# Patient Record
Sex: MALE | Race: WHITE | Employment: FULL TIME | URBAN - METROPOLITAN AREA
[De-identification: names, ages, dates, MRNs, and addresses within clinical notes are randomized per-mention and may not be internally consistent; named-entity substitution may affect disease eponyms.]

---

## 2018-05-22 PROCEDURE — 87205 SMEAR GRAM STAIN: CPT | Performed by: OTOLARYNGOLOGY

## 2018-05-22 PROCEDURE — 87070 CULTURE OTHR SPECIMN AEROBIC: CPT | Performed by: OTOLARYNGOLOGY

## 2018-05-25 PROBLEM — L02.11 ABSCESS OF SKIN OF NECK: Status: ACTIVE | Noted: 2018-05-25

## 2024-06-03 ENCOUNTER — GENETICS ENCOUNTER (OUTPATIENT)
Dept: GENETICS | Facility: HOSPITAL | Age: 49
End: 2024-06-03
Attending: GENETIC COUNSELOR, MS
Payer: COMMERCIAL

## 2024-06-03 ENCOUNTER — NURSE ONLY (OUTPATIENT)
Dept: HEMATOLOGY/ONCOLOGY | Facility: HOSPITAL | Age: 49
End: 2024-06-03
Attending: GENETIC COUNSELOR, MS
Payer: COMMERCIAL

## 2024-06-03 DIAGNOSIS — Z80.3 FAMILY HISTORY OF BREAST CANCER: ICD-10-CM

## 2024-06-03 DIAGNOSIS — Z84.81 FAMILY HISTORY OF BRCA2 GENE POSITIVE: Primary | ICD-10-CM

## 2024-06-03 DIAGNOSIS — Z80.9 FAMILY HISTORY OF CANCER: ICD-10-CM

## 2024-06-03 PROCEDURE — 96040 HC GENETIC COUNSELING EA 30 MIN: CPT | Performed by: GENETIC COUNSELOR, MS

## 2024-06-03 PROCEDURE — 36415 COLL VENOUS BLD VENIPUNCTURE: CPT

## 2024-06-03 NOTE — PROGRESS NOTES
Patient Name: Ananda Whiteside  YOB: 1975  Date of Visit: 6/3/2024    Reason for visit: Mr. Whiteside was seen for the purposes of genetic counseling due to a family history of cancer with a BRCA2 [id number] (p.[id number]) pathogenic variant previously found in his mother and sister.     Referring Provider: Dave Alcaraz DO    Medical History: Mr. Whiteside is a pleasant 49 year old male presenting with no personal history of cancer. He had a slightly elevated PSA on  of 5 ng/mL (0.01-0.4 ng/mL), he has been referred to urology.    He has had a colonoscopy last ~1 year ago which was normal per pt with a 10 year recall. He reports having seen dermatology in the past.       Relevant Family History: Mr. Whiteside has no biological children.     He has 2 sisters, 53y with a dx of inflammatory breast cancer dx at 53y who is currently undergoing chemo, she had genetic testing and was found to have the BRCA2 mutation, and 51y with no cancer hx and negative genetic testing.     Mr. Whiteside's mother (80y) was dx with metastatic bladder/urethral cancer at 77y, she was found to have a suspected germline BRCA2 gene mutation on [facility] somatic xT testing, the BRCA2 c.7254_7255del (p.[id number]) pathogenic variant was confirmed to be germline via dedicated germline panel, [facility] 43 gene panel, x2 VUSs in BRCA1 and CTR9 were seen.     There is 1 maternal uncle who  at 60y possibly dt lung cancer, he was a smoker, and 1 maternal aunt who  at 80y. The maternal grandmother  in her 70s dt natural causes, she had a h/o cancer, possibly bladder cancer, dx in her late-60s. The maternal grandfather  in is 50s dt an unknown cause.    Mr. Whiteside's father  after surgery for a cerebral cavernous angioma, he has no h/o cancer, CVA, or bleeding. He was an only child. The paternal grandfather  in his 60s dt a brain tumor dx in his 60s. The paternal grandmother  at 103y dt natural causes.      The rest of the family history is negative for other significant genetic conditions, cancers, or birth defects of any kind. See scanned pedigree for full family history reported during the session.    Mr. Whiteside's maternal and paternal ethnicity is [place]. He is unaware of any Ashkenazi Taoism heritage.    Summary: Because Mr. Whiteside's mother and sister were found to have a pathogenic BRCA2 variant, Mr. Whiteside has an estimated 50% chance to have inherited the same BRCA2 variant as his relatives, genetic testing is indicated. Mr. Whiteside's mother was also found to have 2 variants of uncertain significance, a variant of uncertain significance (VUS) means that a change has been identified in a cancer susceptibility gene; however, it is uncertain if the variant is pathogenic or a non-deleterious change. With time, the variant may be reclassified as either positive or negative. No changes in management of family members is recommended based on a VUS result.     I reviewed the following information with Mr. Whiteside about BRCA2 gene mutations. He was given an informational handout to take with him as well.   Information and Medical Management Recommendations for Individuals with a BRCA2 Pathogenic Variant  Individuals with a single pathogenic BRCA2 mutation have an increased risk to develop certain cancers in their lifetime. BRCA2-related cancer risks include a >60% risk to develop female breast cancer and a significantly increased risk for a second breast primary. There may be up to a >20% 15-year cumulative risk of developing a second contralateral breast cancer in pre-menopausal women and a 25% 20-year cumulative risk of developing a second contralateral breast cancer in women in general. Risks of developing a contralateral breast cancer may vary depending on age at diagnosis of first breast cancer, ER status and/or family history. Other cancer risks associated with BRCA2 pathogenic variants include  an estimated 13-29% lifetime risk of ovarian cancer (females only), a 19-61% lifetime risk for prostate cancer (males only), up to a 5-10% lifetime risk for pancreatic cancer (males and females), an 1.8-7.1% risk for male breast cancer by age 70y, and an elevated risk for melanoma (males and females). It is not possible to predict when or whether a person with a BRCA2 pathogenic variant will develop cancer or what type of cancer they may develop.     Currently recommended medical management for female BRCA1/2 carriers according to the NCCN (V 3.2024) is as follows:   Breast cancer risk  Breast awareness starting at age 18 years.  Clinical breast exam, every 6-12 months, starting at age 25 years.  Breast screening  Age 25-29 years, annual breast MRI screening with and without contrast (or mammogram, only if MRI is unavailable), individualized based on family history if a breast cancer diagnosis before age 30 is present.  Age 30-75 years, annual mammogram and breast MRI screening with and without contrast.  Age >75 years, management should be considered on an individual basis.  For individuals with a BRCA1/2 pathogenic variant who are treated for breast cancer and have not had a bilateral mastectomy, screening with annual mammogram and breast MRI should continue as described above.  Discuss option of risk-reducing mastectomy (RRM, surgical removal of the breasts) and  regarding degree of protection, reconstruction options, risks, and psychosocial and quality of life aspects.    Consider risk reduction agents as options for breast cancer, including discussion of risks and benefits.    Ovarian/fallopian tube/peritoneal/uterine cancer risk  Surgical risk reduction  Recommend risk-reducing salpingo-oophorectomy (RRSO, surgical removal of the fallopian tubes and ovaries), typically between 40-45 years.  Because ovarian cancer onset in individuals with BRCA2 pathogenic variants is an average of 8-10 years later than  in patients with BRCA1 pathogenic variants, it is reasonable to delay RRSO for management of ovarian cancer risk until age 40-45 years in patients with BRCA2 pathogenic variants unless age at diagnosis in the family warrants earlier age for consideration of prophylactic surgery.   Consideration of concurrent hysterectomy (surgical removal of the uterus) given preliminary evidence of elevated serous uterine cancer risk in patients with BRCA1/2 pathogenic/likely pathogenic variants.  Salpingectomy (surgical removal of the fallopian tubes) reduces the risk of ovarian cancer in the general population and is an option for premenopausal patients with hereditary cancer risk who are not yet ready for oophorectomy (surgical removal of the ovaries), completion oophorectomy is still recommended as per gene-specific guidelines. Clinical trials of interval salpingectomy and delayed oophorectomy are ongoing. Strong consideration of surgical choice study  participation if available  CA-125 and pelvic ultrasound are recommended for preoperative planning.  Address bone health, cardiovascular health, psychosocial health, neurologic health, sexual health, and generalized quality-of-life aspects of undergoing RRSO. Consider preoperative menopause management consultation if patient is still premenopausal at time of RRSO.  Hormone replacement therapy is generally not contraindicated and thus should be discussed with premenopausal patients who do not have a personal history of breast cancer.  Non-surgical risk reduction  Consultation with gynecologic oncologist or gynecologist with expertise/experience in genetic susceptibility to gynecologic cancer recommended.  Consideration of combination estrogen/progestin (E/P) contraception (such as oral contraceptive pills [OCP]) for ovulation suppression. Overall, studies in pathogenic/likely pathogenic variant carriers support significant risk reduction benefits for ovarian  cancer.  Levonorgestrel intrauterine device (LNG-IUD) has been shown to reduce risk for ovarian cancer in the average risk population.  Other considerations in premenopausal women  If desired, refer to fertility specialists for discussion of age-related fertility considerations, options for in vitro fertilization, eggand embryo-cryopreservation, and consideration of preimplantation genetic testing, gestational carrier and adoption.     Currently recommended medical management for male BRCA1/2 carries according to the NCCN (V 3.2024) is as follows:  Breast self-exam training and education starting at 35 y.  Clinical breast exam, every 12 months, starting at age 35 y.  Consider annual mammogram screening, especially for male BRCA2 mutation carriers in whom the lifetime risk of breast cancer is up to 7%, starting at age 50y or 10 years before the earliest known male breast cancer in the family (whichever comes first).  Recommend prostate cancer screening for BRCA2 carriers starting at age 40 y.    Melanoma screening (applies to both males and females):  No specific screening guidelines exist for melanoma, but general melanoma risk management is recommended, such as annual full-body skin examination and minimizing UV/sun exposure.     Pancreatic cancers screening (applies to both males and females):  NCCN recommends pancreatic cancer screening in BRCA1/2 carriers be limited to those with a first- or second-degree relative affected with pancreatic cancer. Ideally,  pancreatic cancer screening should be performed in experienced high-volume centers taking place after an in-depth discussion about the potential limitations to screening, including cost, the high incidence of benign or indeterminate pancreatic abnormalities, and uncertainties about the potential benefits of pancreatic cancer screening. If recommended or desired, pancreatic cancer screening typically includes annual endoscopic ultrasound and/or annual contrast  enhanced MRI/MRCP of the pancreas starting at age 50 or 10 years younger than the earliest age of pancreatic cancer diagnosis in the family, with consideration of shorter screening intervals based on clinical judgement and screening findings.    The full version of the NCCN Guidelines is available at the following website:  www.NCCN.org.  All medical management decisions should be discussed with a physician.      Pathogenic variants in the BRCA2 gene are inherited in an autosomal dominant fashion. This means that children, brothers, sisters, and parents of individuals with an BRCA2 variant have a 1 in 2 (50%) chance of having the variant as well. Individuals with an BRCA2 pathogenic variant may develop cancer, or they may not develop and cancers at all. Both males and females can inherit a familial BRCA2 variant and can pass that it on to their children.    Additionally, individuals with a single pathogenic BRCA2 variant are also carriers of autosomal recessive Fanconi anemia. Fanconi anemia is characterized by bone marrow failure with variable additional anomalies, which often include short stature, abnormal skin pigmentation, abnormal thumbs, malformations of the skeletal and central nervous systems, and developmental delay. Risk of leukemia and early-onset solid tumors is significantly elevated with this disorder. For there to be a risk of Fanconi anemia in offspring, both the patient and their partner would each have to carry a pathogenic variant in BRCA2; in this case, the risk to have an affected child is 25%.    Possible genetic testing results  If testing is performed, three results are possible: positive, negative, and variant of uncertain significance.  A positive result indicates a mutation has been identified, and there is an increased risk for the cancers associated with the specific gene.  Since mutations in most cancer susceptibility genes are inherited in an autosomal dominant fashion, siblings and  children of individuals with a mutation have a 50% risk of carrying the mutation as well.      A negative test result would indicate that no mutation was identified in a cancer susceptibility gene.  While testing detects gene mutations, it is possible for a mutation to be present and go undetected.  It is also possible for a mutation to be located in a gene other than those being tested.     A variant of uncertain significance means that a change has been identified a cancer susceptibility gene; however, it is uncertain if the variant is pathogenic or a non-deleterious change.  With time, the variant may be reclassified as either positive or negative.    Since mutation identification is necessary, an affected family member is preferred in order to confirm that a mutation is indeed present in a particular family.  If the mutation can be identified in an affected individual, this information can be used to screen other at-risk individuals in the family.  Individuals who are positive for the same mutation would be expected to have a much greater risk for developing hereditary cancer during their lifetime than the general population and surveillance and management would be rigorous.  For those individuals who are found to not carry the mutation, risks for developing cancer during their lifetime would return to those expected for individuals in the general population.  It should be emphasized that absence of a mutation in an at-risk individual would not eliminate their risk for cancer, but simply return them to the risk expected for the general population. If no affected individual is available for testing, a negative result, while reassuring, cannot be completely informative of the familial cancer risk as it is unknown whether no mutation was found because the individual tested is truly negative for the familial mutation or whether the familial mutation was unable to be detected by the genetic testing method used. In  such cases, screening and follow-up should be guided by personal and family history.     It should be noted that no one under age 18 can have testing performed for mutations in high-penetrance cancer predisposition genes for adult-onset conditions. Mr. Saba genetic information is protected under the Genetic Information Nondiscrimination Act of 2008 (RISHABH). RISHABH is a federal law protecting individuals from genetic discrimination in health insurance and most places of employment. RISHABH protections against genetic discrimination do not apply to other forms of insurance such as life, long term care, disability, and  insurance. Individuals without such policies in place may wish to consider doing so before proceeding with genetic testing, since their genetic information could potentially be used to inform decisions concerning eligibility, premiums, and coverage.    Because Mr. Whiteside's mother and sister were found to have a BRCA2 c.[id number] (p.[id number]*2) pathogenic variant, genetic testing for the know familial BRCA2 pathogenic variant is indicated based on the NCCN guidelines (BOP V.3.2024).      Mr. Whiteside appeared to understand the information presented. On the day of the visit Mr. Whiteside elected to proceed with genetic testing for the know familial BRCA2 mutation and other hereditary cancer syndromes. My office will call Mr. Whiteside as soon as results are received; post-test counseling can be scheduled at that time. Thank you for allowing me to participate in the care of your patient; please do not hesitate to contact my office if you have any questions or concerns, [phone].    Plan:   Blood was drawn and sent out to [facility] for the known familial BRCA2 c.7254_7255del (p.[id number]*2) pathogenic variant previously found in his mother and sister with automatic reflex to the common hereditary cancers panel (TAT: 2 weeks, 48 genes total).   The Genetics office will call Mr. Whiteside when  results are available.  Recommendations for Mr. Whiteside and family members will depend the above genetic testing results.      Send to: [facility]  Time spent with patient: 35 minutes      Indira Jacobsen MS, GC

## 2024-06-10 ENCOUNTER — GENETICS ENCOUNTER (OUTPATIENT)
Dept: HEMATOLOGY/ONCOLOGY | Facility: HOSPITAL | Age: 49
End: 2024-06-10

## 2024-06-10 DIAGNOSIS — Z15.01 BRCA2 GENE MUTATION POSITIVE IN MALE: Primary | ICD-10-CM

## 2024-06-10 DIAGNOSIS — Z15.03 BRCA2 GENE MUTATION POSITIVE IN MALE: Primary | ICD-10-CM

## 2024-06-10 DIAGNOSIS — Z15.09 BRCA2 GENE MUTATION POSITIVE IN MALE: Primary | ICD-10-CM

## 2024-06-10 NOTE — PROGRESS NOTES
Patient Name: Ananda Whiteside  YOB: 1975    Referring Provider:  Dave Alcaraz DO      Reason for Referral:  Mr. Whiteside had genetic testing performed on 6/3/2024 because of a family history of cancer with a BRCA2 c.7254_7255del (p.Auw5852Ucbhh*2) pathogenic variant previously found in his mother and sister.      Genetic Testing Result:  Positive. Mr. Whiteside was found to have inherited the familial BRCA2 c.7254_7255del (p.Sbk6987Kqurf*2) pathogenic variant.  No other known pathogenic variants were found in a total of 48 genes on the [employer] common hereditary cancers panel including: APC, MARCEL, AXIN2, BAP1, BARD1, BMPR1A, BRCA1, BRCA2, BRIP1, CDH1, CDK4, CDKN2A, CHEK2, CTNNA1, DICER1, EPCAM, FH, GREM1, HOXB13, KIT, MBD4, MEN1, MLH1, MSH2, MSH3, MSH6, MUTYH, NF1, NTHL1, PALB2, PDGFRA, PMS2, POLD1, POLE, PTEN, RAD51C, RAD51D, SDHA, SDHB, SDHC, SDHD, SMAD4, SMARCA4, STK11, TP53, TSC1, TSC2, VHL. A variant of uncertain significance, BRCA1 c.4836G>T (p.[id number]), was identified.  Please refer to the report from [facility] for additional testing information.  These results were discussed with Mr. Whiteside via telephone on 6/10/2024.    Summary and Plan:   Mr. Whiteside was found to have inherited the known familial BRCA2 c.7254_7255del (p.Ueq7628Tienj*2) pathogenic variant (harmful genetic mutation) previously. 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 This information is important for Mr. Whiteside and relatives to be aware of. He should discuss this result and the below BRCA2-specific cancer screening recommendations with his doctors.     Mr. Whiteside was also found to have a BRCA1 c.4836G>T (p.Dky7260Clu) variant of uncertain significance. A variant of uncertain significance (VUS) means that a genetic variant has been identified in a cancer  susceptibility gene; however, it is currently uncertain if the variant is pathogenic (harmful) or benign (harmless).  With time, the variant may be reclassified as either harmful or harmless, most VUS's end up being reclassified as harmless variants. Should a VUS get reclassified in the future, the genetic testing laboratory will issue an amended report with the updated classification. No changes in management or testing of family members is recommended based on a VUS result.    The limitations of the testing were discussed with Mr. Whiteside including the chance that additional pathogenic variants in a gene other than those included in this panel might be the cause of cancer in Mr. Whiteisde or in relatives.    Information and Medical Management Recommendations for Individuals with a BRCA2 Pathogenic Variant  Individuals with a single pathogenic BRCA2 mutation have an increased risk to develop certain cancers in their lifetime. BRCA2-related cancer risks include a >60% risk to develop female breast cancer and a significantly increased risk for a second breast primary. There may be up to a >20% 15-year cumulative risk of developing a second contralateral breast cancer in pre-menopausal women and a 25% 20-year cumulative risk of developing a second contralateral breast cancer in women in general. Risks of developing a contralateral breast cancer may vary depending on age at diagnosis of first breast cancer, ER status and/or family history. Other cancer risks associated with BRCA2 pathogenic variants include an estimated 13-29% lifetime risk of ovarian cancer (females only), a 19-61% lifetime risk for prostate cancer (males only), up to a 5-10% lifetime risk for pancreatic cancer (males and females), an 1.8-7.1% risk for male breast cancer by age 70y, and an elevated risk for melanoma (males and females). It is not possible to predict when or whether a person with a BRCA2 pathogenic variant will develop cancer or what  type of cancer they may develop.     Currently recommended medical management for male BRCA1/2 carries according to the NCCN (V 3.2024) is as follows:  Breast self-exam training and education starting at 35 y.  Clinical breast exam, every 12 months, starting at age 35 y.  Consider annual mammogram screening, especially for male BRCA2 mutation carriers in whom the lifetime risk of breast cancer is up to 7%, starting at age 50y or 10 years before the earliest known male breast cancer in the family (whichever comes first).  Recommend prostate cancer screening for BRCA2 carriers starting at age 40 y.    Melanoma screening (applies to both males and females):  No specific screening guidelines exist for melanoma, but general melanoma risk management is recommended, such as annual full-body skin examination and minimizing UV/sun exposure.     Pancreatic cancers screening (applies to both males and females):  NCCN recommends pancreatic cancer screening in BRCA1/2 carriers be limited to those with a first- or second-degree relative affected with pancreatic cancer, Mr. Whiteside reports no family history of pancreatic cancer. Ideally,  pancreatic cancer screening should be performed in experienced high-volume centers taking place after an in-depth discussion about the potential limitations to screening, including cost, the high incidence of benign or indeterminate pancreatic abnormalities, and uncertainties about the potential benefits of pancreatic cancer screening. If recommended or desired, pancreatic cancer screening typically includes annual endoscopic ultrasound and/or annual contrast enhanced MRI/MRCP of the pancreas starting at age 50 or 10 years younger than the earliest age of pancreatic cancer diagnosis in the family, with consideration of shorter screening intervals based on clinical judgement and screening findings.    The full version of the NCCN Guidelines is available at the following website:  www.NCCN.org.   All medical management decisions should be discussed with a physician.      Pathogenic variants in the BRCA2 gene are inherited in an autosomal dominant fashion. This means that children, brothers, sisters, and parents of individuals with an BRCA2 variant have a 1 in 2 (50%) chance of having the variant as well. Individuals with an BRCA2 pathogenic variant may develop cancer, or they may not develop and cancers at all. Both males and females can inherit a familial BRCA2 variant and can pass that it on to their children.    Additionally, individuals with a single pathogenic BRCA2 variant are also carriers of autosomal recessive Fanconi anemia. Fanconi anemia is characterized by bone marrow failure with variable additional anomalies, which often include short stature, abnormal skin pigmentation, abnormal thumbs, malformations of the skeletal and central nervous systems, and developmental delay. Risk of leukemia and early-onset solid tumors is significantly elevated with this disorder. For there to be a risk of Fanconi anemia in offspring, both the patient and their partner would each have to carry a pathogenic variant in BRCA2; in this case, the risk to have an affected child is 25%.    Support/information websites  [employer]: https://www.facingourrisk.org/   [employer] [place]: https://brightpink.org/  American Cancer Society: www.cancer.org    Implications for family members  It is advantageous to know if a BRCA2 pathogenic variant is present as medical management recommendations can be implemented. At-risk relatives can be identified, allowing pursuit of a diagnostic evaluation. In addition, the available information regarding hereditary cancer susceptibility genes is constantly evolving and more clinically relevant BRCA2 data is likely to become available in the near future. Awareness of this cancer predisposition allows patients to be vigilant in maintaining close and regular contact with their local healthcare providers in  anticipation of new information, inform at-risk family members, and diligently follow condition-specific screening protocols.     I encouraged Mr. Whiteside to share the genetic test results with at risk family members on his maternal side so that they may have their cancer risks assessed by a physician and/or genetic counselor. Mr. Whiteside is advised to discuss the genetic testing result and recommendations for increased surveillance for BRCA2-associated cancers with his doctors. Mr. Whiteside is also encouraged to contact me on an annual basis to learn if there have been any updates in genetic information that would apply or if the personal and/or family history changes. Please do not hesitate to contact my office if you have any questions or concerns, [phone].     Indira Jacobsen MS, CGC